# Patient Record
(demographics unavailable — no encounter records)

---

## 2024-10-08 NOTE — ASSESSMENT
[FreeTextEntry1] : s/p right cubital tunnel decompression (DOS: 1/4/24) - discussed possible recurrence. Will pursue OT for 6 weeks; if persistent, can consider revision with anterior transition. Symptoms had dissipated fully and for 3 weeks he as experienced persistent numbness.   Consider repeat EMG/NCV at beginning of November.  Script for Medrol sent to pharmacy  F/u 6weeks (consider anterior transposition/revision)

## 2024-10-08 NOTE — IMAGING
[de-identified] : Right wrist with no swelling nor erythema. Able to make fist, oppose thumb to small finger and abduct fingers, no overt atrophy. Incision well healed, no erythema nor drainage. Intact sensation in median and intact at superficial radial and decreased in small and ulnar ring finger(decreased at ulnar hand) prior to provocative testing. <2sec cap refill.

## 2024-10-08 NOTE — HISTORY OF PRESENT ILLNESS
[de-identified] : 57M, RHD presents with right hand pinky and ring finger numbness since October 2023, no specific cause of injury/ trauma. Referred by Dr. Read, EMG done with Dr. Mooney.   1/16/24: s/p right cubital tunnel decompression (DOS: 1/4/24), reports experiencing occasional numbness/ tingling, doing well overall.   03/27/24: s/p right cubital tunnel decompression [01/04/24]. Patient reports no pain or discomfort at this time. Patient reports some numbness in his right small and ring finger when leaning on his right elbow, but state it subsides when he stops leaning on it.   09/27/24: Follow up for the right elbow. Patient reports that he is experiencing numbness that can radiate into the pinky and ring finger, tenderness in the elbow. Taking ibuprofen as needed.    10/8/24: Patient presents today for a follow up for right elbow pain, continues to experience loss of strength as well as numbness/tingling sensation. Attending physical therapy 2x a week with significant improvement,

## 2024-10-08 NOTE — HISTORY OF PRESENT ILLNESS
[de-identified] : 57M, RHD presents with right hand pinky and ring finger numbness since October 2023, no specific cause of injury/ trauma. Referred by Dr. Read, EMG done with Dr. Mooney.   1/16/24: s/p right cubital tunnel decompression (DOS: 1/4/24), reports experiencing occasional numbness/ tingling, doing well overall.   03/27/24: s/p right cubital tunnel decompression [01/04/24]. Patient reports no pain or discomfort at this time. Patient reports some numbness in his right small and ring finger when leaning on his right elbow, but state it subsides when he stops leaning on it.   09/27/24: Follow up for the right elbow. Patient reports that he is experiencing numbness that can radiate into the pinky and ring finger, tenderness in the elbow. Taking ibuprofen as needed.    10/8/24: Patient presents today for a follow up for right elbow pain, continues to experience loss of strength as well as numbness/tingling sensation. Attending physical therapy 2x a week with significant improvement,

## 2024-10-08 NOTE — IMAGING
[de-identified] : Right wrist with no swelling nor erythema. Able to make fist, oppose thumb to small finger and abduct fingers, no overt atrophy. Incision well healed, no erythema nor drainage. Intact sensation in median and intact at superficial radial and decreased in small and ulnar ring finger(decreased at ulnar hand) prior to provocative testing. <2sec cap refill.

## 2024-11-12 NOTE — IMAGING
[de-identified] : Right wrist with no swelling nor erythema. Able to make fist, oppose thumb to small finger and abduct fingers, no overt atrophy. Incision well healed, no erythema nor drainage. Intact sensation in median and intact at superficial radial and decreased in small and ulnar ring finger(decreased at ulnar hand) prior to provocative testing. <2sec cap refill.  4-5cm compressible mass at medial elbow distal to medial epicondyle.

## 2024-11-12 NOTE — HISTORY OF PRESENT ILLNESS
[de-identified] : 57M, RHD presents with right hand pinky and ring finger numbness since October 2023, no specific cause of injury/ trauma. Referred by Dr. Read, EMG done with Dr. Mooney.   1/16/24: s/p right cubital tunnel decompression (DOS: 1/4/24), reports experiencing occasional numbness/ tingling, doing well overall.   03/27/24: s/p right cubital tunnel decompression [01/04/24]. Patient reports no pain or discomfort at this time. Patient reports some numbness in his right small and ring finger when leaning on his right elbow, but state it subsides when he stops leaning on it.   09/27/24: Follow up for the right elbow. Patient reports that he is experiencing numbness that can radiate into the pinky and ring finger, tenderness in the elbow. Taking ibuprofen as needed.    10/8/24: Patient presents today for a follow up for right elbow pain, continues to experience loss of strength as well as numbness/tingling sensation. Attending physical therapy 2x a week with significant improvement,  11/12/24: Follow up ,s/p right cubital tunnel decompression (1/4/24). He admits to PT 2 x week, finished Medrol dose pack and wears brace at night. He reports he feels the same since last visit and here to review EMG/NCV results.

## 2024-11-12 NOTE — IMAGING
[de-identified] : Right wrist with no swelling nor erythema. Able to make fist, oppose thumb to small finger and abduct fingers, no overt atrophy. Incision well healed, no erythema nor drainage. Intact sensation in median and intact at superficial radial and decreased in small and ulnar ring finger(decreased at ulnar hand) prior to provocative testing. <2sec cap refill.  4-5cm compressible mass at medial elbow distal to medial epicondyle.

## 2024-11-12 NOTE — ASSESSMENT
[FreeTextEntry1] : s/p right cubital tunnel decompression (DOS: 1/4/24) - discussed possible recurrence. Will pursue OT for 6 weeks; if persistent, can consider revision with anterior transition. Symptoms had dissipated fully and for 3 weeks he as experienced persistent numbness.   Reviewed RUE EMG/NCV of right moderate to severe cubital tunnel syndrome with acute on chronic findings and C5, C6, C7 radic.  In light of medial elbow mass/prominence will obtain right elbow MRI without contrast to assess for ganglion recurrence that may be compressing the ulnar nerve.  F/u after MRI (consider mass excision vs anterior transposition/revision)

## 2024-11-12 NOTE — HISTORY OF PRESENT ILLNESS
[de-identified] : 57M, RHD presents with right hand pinky and ring finger numbness since October 2023, no specific cause of injury/ trauma. Referred by Dr. Read, EMG done with Dr. Mooney.   1/16/24: s/p right cubital tunnel decompression (DOS: 1/4/24), reports experiencing occasional numbness/ tingling, doing well overall.   03/27/24: s/p right cubital tunnel decompression [01/04/24]. Patient reports no pain or discomfort at this time. Patient reports some numbness in his right small and ring finger when leaning on his right elbow, but state it subsides when he stops leaning on it.   09/27/24: Follow up for the right elbow. Patient reports that he is experiencing numbness that can radiate into the pinky and ring finger, tenderness in the elbow. Taking ibuprofen as needed.    10/8/24: Patient presents today for a follow up for right elbow pain, continues to experience loss of strength as well as numbness/tingling sensation. Attending physical therapy 2x a week with significant improvement,  11/12/24: Follow up ,s/p right cubital tunnel decompression (1/4/24). He admits to PT 2 x week, finished Medrol dose pack and wears brace at night. He reports he feels the same since last visit and here to review EMG/NCV results.

## 2024-12-13 NOTE — IMAGING
[de-identified] : Right wrist with no swelling nor erythema. Able to make fist, oppose thumb to small finger and abduct fingers, no overt atrophy. Incision well healed, no erythema nor drainage. Intact sensation in median and intact at superficial radial and decreased in small and ulnar ring finger(decreased at ulnar hand) prior to provocative testing. <2sec cap refill.  4-5cm compressible mass at medial elbow distal to medial epicondyle.

## 2024-12-13 NOTE — HISTORY OF PRESENT ILLNESS
[de-identified] : 57M, RHD presents with right hand pinky and ring finger numbness since October 2023, no specific cause of injury/ trauma. Referred by Dr. Read, EMG done with Dr. Mooney.   1/16/24: s/p right cubital tunnel decompression (DOS: 1/4/24), reports experiencing occasional numbness/ tingling, doing well overall.   03/27/24: s/p right cubital tunnel decompression [01/04/24]. Patient reports no pain or discomfort at this time. Patient reports some numbness in his right small and ring finger when leaning on his right elbow, but state it subsides when he stops leaning on it.   09/27/24: Follow up for the right elbow. Patient reports that he is experiencing numbness that can radiate into the pinky and ring finger, tenderness in the elbow. Taking ibuprofen as needed.    10/8/24: Patient presents today for a follow up for right elbow pain, continues to experience loss of strength as well as numbness/tingling sensation. Attending physical therapy 2x a week with significant improvement,  11/12/24: Follow up ,s/p right cubital tunnel decompression (1/4/24). He admits to PT 2 x week, finished Medrol dose pack and wears brace at night. He reports he feels the same since last visit and here to review EMG/NCV results.   12/13/24: s/p right cubital tunnel decompression [01/04/24]. Patient is here to review MRI results of his right elbow, performed on 11/15/24 at .

## 2025-01-03 NOTE — HISTORY OF PRESENT ILLNESS
[de-identified] : 57M, RHD presents with right hand pinky and ring finger numbness since October 2023, no specific cause of injury/ trauma. Referred by Dr. Read, EMG done with Dr. Mooney.   1/16/24: s/p right cubital tunnel decompression (DOS: 1/4/24), reports experiencing occasional numbness/ tingling, doing well overall.   03/27/24: s/p right cubital tunnel decompression [01/04/24]. Patient reports no pain or discomfort at this time. Patient reports some numbness in his right small and ring finger when leaning on his right elbow, but state it subsides when he stops leaning on it.   09/27/24: Follow up for the right elbow. Patient reports that he is experiencing numbness that can radiate into the pinky and ring finger, tenderness in the elbow. Taking ibuprofen as needed.    10/8/24: Patient presents today for a follow up for right elbow pain, continues to experience loss of strength as well as numbness/tingling sensation. Attending physical therapy 2x a week with significant improvement,  11/12/24: Follow up ,s/p right cubital tunnel decompression (1/4/24). He admits to PT 2 x week, finished Medrol dose pack and wears brace at night. He reports he feels the same since last visit and here to review EMG/NCV results.   12/13/24: s/p right cubital tunnel decompression [01/04/24]. Patient is here to review MRI results of his right elbow, performed on 11/15/24 at .   1/3/25: Follow up for right elbow pain. Right pinky and ring finger numbness/tingling. Patient is scheduled for surgery 1/16/25 and is here for preoperative visit.

## 2025-01-03 NOTE — IMAGING
[de-identified] : Right wrist with no swelling nor erythema. Able to make fist, oppose thumb to small finger and abduct fingers, no overt atrophy. Incision well healed, no erythema nor drainage. Intact sensation in median and intact at superficial radial and decreased in small and ulnar ring finger(decreased at ulnar hand) prior to provocative testing. <2sec cap refill.  4-5cm compressible mass at medial elbow distal to medial epicondyle.  Right elbow MRI with ganglion cyst compressing ulnar nerve.

## 2025-01-03 NOTE — ASSESSMENT
[FreeTextEntry1] : s/p right cubital tunnel decompression (DOS: 1/4/24) - discussed possible recurrence. Will pursue OT for 6 weeks; if persistent, can consider revision with anterior transition. Symptoms had dissipated fully and for 3 weeks he as experienced persistent numbness.   Reviewed RUE EMG/NCV of right moderate to severe cubital tunnel syndrome with acute on chronic findings and C5, C6, C7 radic.  Right cubital tunnel syndrome, recurrence - reviewed MRI and RUE EMG/NCV results of right moderate cubital tunnel syndrome and right C5,C6,C7 radiculopathy. Given patient's severity and continued symptoms despite nonoperative management, patient indicated for decompression. We discussed the risks, benefits and alternatives including risk of neurovascular injury, wound dehiscence/infection, continued numbness, continued weakness, need for reoperation, DVT and medical complications associated with anesthesia. Discussed that decompression halts progression, but that improvement with existing sensory or motor deficits are not guaranteed to return. Patient understood this conversation and all questions were answered. He will consider options and call if he would like to proceed.  Plan for right cubital tunnel decompression revision, anterior transposition, ganglion cyst excision under local with sedation. [facility].  F/u for surgery, 10 days thereafter.

## 2025-02-04 NOTE — IMAGING
[de-identified] : Right wrist with no swelling nor erythema. Able to make fist, oppose thumb to small finger and abduct fingers, no overt atrophy. Incision well healed, no erythema nor drainage. Intact sensation in median and intact at superficial radial and decreased in small and ulnar ring finger(decreased at ulnar hand) prior to provocative testing. <2sec cap refill.   Right elbow MRI with ganglion cyst compressing ulnar nerve.

## 2025-02-04 NOTE — ASSESSMENT
[FreeTextEntry1] : s/p right cubital tunnel decompression (DOS: 1/4/24) - discussed possible recurrence. Will pursue OT for 6 weeks; if persistent, can consider revision with anterior transition. Symptoms had dissipated fully and for 3 weeks he as experienced persistent numbness.   Reviewed RUE EMG/NCV of right moderate to severe cubital tunnel syndrome with acute on chronic findings and C5, C6, C7 radic.  s/p right elbow cubital tunnel revision, mass excision, anterior transposition (1/16/25) - progressing well postop. Suture ends removed, patient tolerated well. Ease into use.  F/u 6 weeks

## 2025-02-04 NOTE — IMAGING
[de-identified] : Right wrist with no swelling nor erythema. Able to make fist, oppose thumb to small finger and abduct fingers, no overt atrophy. Incision well healed, no erythema nor drainage. Intact sensation in median and intact at superficial radial and decreased in small and ulnar ring finger(decreased at ulnar hand) prior to provocative testing. <2sec cap refill.   Right elbow MRI with ganglion cyst compressing ulnar nerve.

## 2025-02-04 NOTE — HISTORY OF PRESENT ILLNESS
[de-identified] : 57M, RHD presents with right hand pinky and ring finger numbness since October 2023, no specific cause of injury/ trauma. Referred by Dr. Read, EMG done with Dr. Mooney.   1/16/24: s/p right cubital tunnel decompression (DOS: 1/4/24), reports experiencing occasional numbness/ tingling, doing well overall.   03/27/24: s/p right cubital tunnel decompression [01/04/24]. Patient reports no pain or discomfort at this time. Patient reports some numbness in his right small and ring finger when leaning on his right elbow, but state it subsides when he stops leaning on it.   09/27/24: Follow up for the right elbow. Patient reports that he is experiencing numbness that can radiate into the pinky and ring finger, tenderness in the elbow. Taking ibuprofen as needed.    10/8/24: Patient presents today for a follow up for right elbow pain, continues to experience loss of strength as well as numbness/tingling sensation. Attending physical therapy 2x a week with significant improvement,  11/12/24: Follow up ,s/p right cubital tunnel decompression (1/4/24). He admits to PT 2 x week, finished Medrol dose pack and wears brace at night. He reports he feels the same since last visit and here to review EMG/NCV results.   12/13/24: s/p right cubital tunnel decompression [01/04/24]. Patient is here to review MRI results of his right elbow, performed on 11/15/24 at .   1/3/25: Follow up for right elbow pain. Right pinky and ring finger numbness/tingling. Patient is scheduled for surgery 1/16/25 and is here for preoperative visit.   1/31/25: 1st POV, s/p right elbow cubital tunnel revision, mass excision, anterior transposition (1/16/25). Patient reports feeling well, denies fever/chills. He states having minimal numbness in his right hand with improvements.

## 2025-02-04 NOTE — HISTORY OF PRESENT ILLNESS
[de-identified] : 57M, RHD presents with right hand pinky and ring finger numbness since October 2023, no specific cause of injury/ trauma. Referred by Dr. Read, EMG done with Dr. Mooney.   1/16/24: s/p right cubital tunnel decompression (DOS: 1/4/24), reports experiencing occasional numbness/ tingling, doing well overall.   03/27/24: s/p right cubital tunnel decompression [01/04/24]. Patient reports no pain or discomfort at this time. Patient reports some numbness in his right small and ring finger when leaning on his right elbow, but state it subsides when he stops leaning on it.   09/27/24: Follow up for the right elbow. Patient reports that he is experiencing numbness that can radiate into the pinky and ring finger, tenderness in the elbow. Taking ibuprofen as needed.    10/8/24: Patient presents today for a follow up for right elbow pain, continues to experience loss of strength as well as numbness/tingling sensation. Attending physical therapy 2x a week with significant improvement,  11/12/24: Follow up ,s/p right cubital tunnel decompression (1/4/24). He admits to PT 2 x week, finished Medrol dose pack and wears brace at night. He reports he feels the same since last visit and here to review EMG/NCV results.   12/13/24: s/p right cubital tunnel decompression [01/04/24]. Patient is here to review MRI results of his right elbow, performed on 11/15/24 at .   1/3/25: Follow up for right elbow pain. Right pinky and ring finger numbness/tingling. Patient is scheduled for surgery 1/16/25 and is here for preoperative visit.   1/31/25: 1st POV, s/p right elbow cubital tunnel revision, mass excision, anterior transposition (1/16/25). Patient reports feeling well, denies fever/chills. He states having minimal numbness in his right hand with improvements.